# Patient Record
Sex: FEMALE | Race: WHITE | NOT HISPANIC OR LATINO | Employment: UNEMPLOYED | URBAN - METROPOLITAN AREA
[De-identification: names, ages, dates, MRNs, and addresses within clinical notes are randomized per-mention and may not be internally consistent; named-entity substitution may affect disease eponyms.]

---

## 2017-03-31 ENCOUNTER — EPISODE CHANGES (OUTPATIENT)
Dept: HEPATOLOGY | Facility: CLINIC | Age: 42
End: 2017-03-31

## 2018-06-03 ENCOUNTER — HOSPITAL ENCOUNTER (EMERGENCY)
Facility: HOSPITAL | Age: 43
Discharge: HOME | End: 2018-06-04
Attending: EMERGENCY MEDICINE
Payer: MEDICAID

## 2018-06-03 VITALS
HEART RATE: 87 BPM | WEIGHT: 190 LBS | HEIGHT: 60 IN | OXYGEN SATURATION: 98 % | BODY MASS INDEX: 37.3 KG/M2 | RESPIRATION RATE: 22 BRPM | SYSTOLIC BLOOD PRESSURE: 121 MMHG | TEMPERATURE: 98 F | DIASTOLIC BLOOD PRESSURE: 83 MMHG

## 2018-06-03 DIAGNOSIS — S91.319A: ICD-10-CM

## 2018-06-03 DIAGNOSIS — S91.312A LACERATION OF LEFT HEEL, INITIAL ENCOUNTER: Primary | ICD-10-CM

## 2018-06-03 PROCEDURE — 13132 CMPLX RPR F/C/C/M/N/AX/G/H/F: CPT | Mod: LT,,, | Performed by: EMERGENCY MEDICINE

## 2018-06-03 PROCEDURE — 96372 THER/PROPH/DIAG INJ SC/IM: CPT

## 2018-06-03 PROCEDURE — 12044 INTMD RPR N-HF/GENIT7.6-12.5: CPT

## 2018-06-03 PROCEDURE — 13133 CMPLX RPR F/C/C/M/N/AX/G/H/F: CPT | Mod: LT,,, | Performed by: EMERGENCY MEDICINE

## 2018-06-03 PROCEDURE — 99283 EMERGENCY DEPT VISIT LOW MDM: CPT | Mod: 25

## 2018-06-03 PROCEDURE — 63600175 PHARM REV CODE 636 W HCPCS: Performed by: PHYSICIAN ASSISTANT

## 2018-06-03 PROCEDURE — 99283 EMERGENCY DEPT VISIT LOW MDM: CPT | Mod: 25,,, | Performed by: EMERGENCY MEDICINE

## 2018-06-03 PROCEDURE — 25000003 PHARM REV CODE 250: Performed by: PHYSICIAN ASSISTANT

## 2018-06-03 RX ORDER — NAPROXEN 500 MG/1
500 TABLET ORAL
Status: COMPLETED | OUTPATIENT
Start: 2018-06-03 | End: 2018-06-03

## 2018-06-03 RX ORDER — LIDOCAINE HYDROCHLORIDE AND EPINEPHRINE 10; 10 MG/ML; UG/ML
10 INJECTION, SOLUTION INFILTRATION; PERINEURAL ONCE
Status: COMPLETED | OUTPATIENT
Start: 2018-06-04 | End: 2018-06-03

## 2018-06-03 RX ORDER — LORAZEPAM 2 MG/ML
1 INJECTION INTRAMUSCULAR
Status: COMPLETED | OUTPATIENT
Start: 2018-06-03 | End: 2018-06-03

## 2018-06-03 RX ADMIN — LIDOCAINE HYDROCHLORIDE AND EPINEPHRINE 10 ML: 10; 10 INJECTION, SOLUTION INFILTRATION; PERINEURAL at 11:06

## 2018-06-03 RX ADMIN — LORAZEPAM 1 MG: 2 INJECTION INTRAMUSCULAR; INTRAVENOUS at 10:06

## 2018-06-03 RX ADMIN — NAPROXEN 500 MG: 500 TABLET ORAL at 11:06

## 2018-06-04 PROCEDURE — 12044 INTMD RPR N-HF/GENIT7.6-12.5: CPT

## 2018-06-04 PROCEDURE — 25000003 PHARM REV CODE 250: Performed by: PHYSICIAN ASSISTANT

## 2018-06-04 RX ORDER — CEPHALEXIN 250 MG/1
250 CAPSULE ORAL EVERY 12 HOURS
Qty: 14 CAPSULE | Refills: 0 | Status: SHIPPED | OUTPATIENT
Start: 2018-06-04 | End: 2018-06-11

## 2018-06-04 RX ORDER — CEPHALEXIN 500 MG/1
500 CAPSULE ORAL
Status: COMPLETED | OUTPATIENT
Start: 2018-06-04 | End: 2018-06-04

## 2018-06-04 RX ADMIN — CEPHALEXIN 500 MG: 500 CAPSULE ORAL at 12:06

## 2018-06-04 NOTE — ED TRIAGE NOTES
Pt presents to ED c/o cutting foot on glass at home. States that a mirror broke and cut her left foot. Bleeding currently controlled. Large laceration noted to left heel. Denies numbness or tingling to foot.     LOC: Patient name and date of birth verified.  The patient is awake, alert and aware of environment with an appropriate affect, the patient is oriented x 3 and speaking appropriately.  Pt in NAD.    APPEARANCE: Patient resting comfortably and in no acute distress, patient is clean and well groomed, patient's clothing is properly fastened.  SKIN: The skin is warm and dry, color consistent with ethnicity, patient has normal skin turgor and moist mucus membranes, skin intact, no breakdown or brusing noted.  MUSCULOSKELETAL: Patient moving all extremities well, no obvious swelling or deformities noted.  RESPIRATORY: Airway is open and patent, respirations are spontaneous, patient has a normal effort and rate, no accessory muscle use noted.  CARDIAC: Patient has a normal rate and rhythm, no periphreal edema noted, capillary refill < 3 seconds.  ABDOMEN: Soft and non tender to palpation, no distention noted. Bowel sounds present in all four quadrants.  NEUROLOGIC: Eyes open spontaneously, behavior appropriate to situation, follows commands, facial expression symmetrical, bilateral hand grasp equal and even, purposeful motor response noted, normal sensation in all extremities when touched with a finger.

## 2018-06-04 NOTE — DISCHARGE INSTRUCTIONS
Take the antibiotic, Keflex, as prescribed.    Wash the foot with warm water and Dial soap once daily. Apply a very thin layer of antibiotic ointment (over the counter, such as Neosporin). Dap with a clean, dry, piece of gauze. Keep the wound covered. Elevate the foot as much as possible.    If you have pain - apply an ice pack to the area and take Ibuprofen or Tylenol as directed.    If you have fever, redness around the wound, drainage from the wound, or an odor from the wound, return to the ER immediately for further evaluation.

## 2018-06-04 NOTE — ED PROVIDER NOTES
Encounter Date: 6/3/2018    SCRIBE #1 NOTE: I, Daryl Lynne, am scribing for, and in the presence of,  Dr. Cruz. I have scribed the following portions of the note - the APC attestation. Other sections scribed: the X-Ray reading.       History     Chief Complaint   Patient presents with    Laceration     Cut heal on broken mirror, flap of heal skin hanging, bleeding controlled, no other injuries. Hep C +.      This is a 42-year-old female with a past medical history of hep C who presents to the ED with laceration.  Patient reports a mirror breaking in her bedroom, causing injury to her left heel.  She sustained a large laceration to the posterior aspect of her left heel.  Patient was able to obtain hemostasis by applying pressure.  She has full range of motion and denies weakness or numbness.  Her tetanus status is up-to-date.          Review of patient's allergies indicates:  No Known Allergies  Past Medical History:   Diagnosis Date    Anxiety     Back pain     Bipolar depression     Cervical pain (neck)     Hepatitis C     Hyperlipidemia     Insomnia     Sciatic nerve pain     Smoker      Past Surgical History:   Procedure Laterality Date     SECTION      CHOLECYSTECTOMY      [facility]STERECTOMY      [facility]     Family History   Problem Relation Age of Onset    Breast cancer Maternal Grandmother     Ovarian cancer Mother     Colon cancer Neg Hx      Social History   Substance Use Topics    Smoking status: Current Every Day Smoker     Years: 20.00     Types: Cigarettes    Smokeless tobacco: Never Used    Alcohol use Yes      Comment: occasionally     Review of Systems   Constitutional: Negative for chills and fever.   HENT: Negative for sore throat.    Respiratory: Negative for shortness of breath.    Cardiovascular: Negative for chest pain.   Gastrointestinal: Negative for nausea and vomiting.   Genitourinary: Negative for dysuria.   Musculoskeletal: Negative for back pain.   Skin:  Positive for wound.   Neurological: Negative for weakness.   Hematological: Does not bruise/bleed easily.       Physical Exam     Initial Vitals [06/03/18 2231]   BP Pulse Resp Temp SpO2   121/83 87 (!) 22 98.4 °F (36.9 °C) 98 %      MAP       95.67         Physical Exam    Constitutional: She appears well-developed and well-nourished. No distress.   HENT:   Head: Atraumatic.   Eyes: Conjunctivae and EOM are normal. Pupils are equal, round, and reactive to light.   Cardiovascular: Normal rate, regular rhythm and normal heart sounds.   Pulmonary/Chest: Breath sounds normal. No respiratory distress. She has no wheezes. She has no rhonchi. She has no rales.   Abdominal: Soft. Bowel sounds are normal. There is no tenderness. There is no rebound and no guarding.   Musculoskeletal:        Left foot: There is laceration. There is normal range of motion.        Feet:    Neurological: She is alert and oriented to person, place, and time.   Skin: Skin is warm and dry. No rash noted.         ED Course   Lac Repair  Date/Time: 6/4/2018 12:28 AM  Performed by: ANNABELLA TOLENTINO  Authorized by: AMNA PEREIRA   Body area: lower extremity  Location details: left foot  Laceration length: 10 cm  Foreign bodies: no foreign bodies  Tendon involvement: none  Nerve involvement: none  Vascular damage: no  Anesthesia: local infiltration    Anesthesia:  Local Anesthetic: lidocaine 1% with epinephrine  Anesthetic total: 3 mL  Preparation: Patient was prepped and draped in the usual sterile fashion.  Irrigation solution: saline  Irrigation method: syringe  Amount of cleaning: extensive  Debridement: none  Degree of undermining: none  Skin closure: 4-0 nylon  Number of sutures: 20  Technique: simple  Approximation: close  Approximation difficulty: complex  Dressing: non-stick sterile dressing and petrolatum-impregnated gauze  Patient tolerance: Patient tolerated the procedure well with no immediate complications        Labs Reviewed -  No data to display       X-Rays:   Independently Interpreted Readings:   Other Readings:  Left foot x-ray: No evidence of acute bony injury noted.    Medical Decision Making:   History:   Old Medical Records: I decided to obtain old medical records.  Clinical Tests:   Radiological Study: Ordered and Reviewed       APC / Resident Notes:   42-year-old female presents with laceration of left heel.  She has full range of motion and is neurovascularly intact.  The wound is large (10cm) with a flap.The wound was explored to the base without evidence of foreign body. It was repaired at the bedside, as detailed in the procedure note. Dressing applied and crutches provided. Patient instructed on toe touch weight bearing until wound reassessed in 72 hours. Will prescribe Keflex for prophylaxis. Patient instructed on explicit wound care instructions and need for suture removal in 10 days. She is stable for discharge. She and family verbalized understanding. I discussed the care of this patient with my supervising MD.        Scribe Attestation:   Scribe #1: I performed the above scribed service and the documentation accurately describes the services I performed. I attest to the accuracy of the note.    Attending Attestation:     Physician Attestation Statement for NP/PA:   I discussed this assessment and plan of this patient with the NP/PA, but I did not personally examine the patient. The face to face encounter was performed by the NP/PA.    Other NP/PA Attestation Additions:    History of Present Illness: 42 year old female presents for evaluation of a laceration to her left foot earlier tonight.                       Clinical Impression:   The primary encounter diagnosis was Laceration of left heel, initial encounter. A diagnosis of Laceration of heel was also pertinent to this visit.    X-Ray Foot Complete Left   Final Result      Soft tissue laceration involving the heel.  No distinct radiopaque foreign body or acute bony  abnormality.         Electronically signed by: Francis Ma MD   Date:    06/03/2018   Time:    23:36          Disposition:   Disposition: Discharged  Condition: Stable                        Fabby Arias PA-C  06/04/18 0034

## 2019-07-13 ENCOUNTER — HOSPITAL ENCOUNTER (EMERGENCY)
Facility: HOSPITAL | Age: 44
Discharge: HOME | End: 2019-07-13
Attending: EMERGENCY MEDICINE
Payer: MEDICAID

## 2019-07-13 VITALS
BODY MASS INDEX: 41.6 KG/M2 | DIASTOLIC BLOOD PRESSURE: 89 MMHG | HEART RATE: 88 BPM | SYSTOLIC BLOOD PRESSURE: 139 MMHG | RESPIRATION RATE: 18 BRPM | WEIGHT: 211.88 LBS | HEIGHT: 60 IN | TEMPERATURE: 98 F | OXYGEN SATURATION: 98 %

## 2019-07-13 DIAGNOSIS — L03.213 PRESEPTAL CELLULITIS OF RIGHT EYE: Primary | ICD-10-CM

## 2019-07-13 PROCEDURE — 99284 EMERGENCY DEPT VISIT MOD MDM: CPT

## 2019-07-13 PROCEDURE — 25000003 PHARM REV CODE 250: Performed by: PHYSICIAN ASSISTANT

## 2019-07-13 PROCEDURE — 99284 EMERGENCY DEPT VISIT MOD MDM: CPT | Mod: ,,, | Performed by: PHYSICIAN ASSISTANT

## 2019-07-13 PROCEDURE — 99284 PR EMERGENCY DEPT VISIT,LEVEL IV: ICD-10-PCS | Mod: ,,, | Performed by: PHYSICIAN ASSISTANT

## 2019-07-13 RX ORDER — NAPROXEN 500 MG/1
500 TABLET ORAL
Status: COMPLETED | OUTPATIENT
Start: 2019-07-13 | End: 2019-07-13

## 2019-07-13 RX ORDER — AMOXICILLIN AND CLAVULANATE POTASSIUM 875; 125 MG/1; MG/1
1 TABLET, FILM COATED ORAL
Status: COMPLETED | OUTPATIENT
Start: 2019-07-13 | End: 2019-07-13

## 2019-07-13 RX ORDER — SULFAMETHOXAZOLE AND TRIMETHOPRIM 800; 160 MG/1; MG/1
1 TABLET ORAL 2 TIMES DAILY
Qty: 14 TABLET | Refills: 0 | Status: SHIPPED | OUTPATIENT
Start: 2019-07-13 | End: 2019-07-20

## 2019-07-13 RX ORDER — NAPROXEN 500 MG/1
500 TABLET ORAL 2 TIMES DAILY WITH MEALS
Qty: 20 TABLET | Refills: 0 | Status: SHIPPED | OUTPATIENT
Start: 2019-07-13

## 2019-07-13 RX ORDER — AMOXICILLIN AND CLAVULANATE POTASSIUM 875; 125 MG/1; MG/1
1 TABLET, FILM COATED ORAL 2 TIMES DAILY
Qty: 14 TABLET | Refills: 0 | Status: SHIPPED | OUTPATIENT
Start: 2019-07-13

## 2019-07-13 RX ORDER — SULFAMETHOXAZOLE AND TRIMETHOPRIM 800; 160 MG/1; MG/1
1 TABLET ORAL
Status: COMPLETED | OUTPATIENT
Start: 2019-07-13 | End: 2019-07-13

## 2019-07-13 RX ADMIN — SULFAMETHOXAZOLE AND TRIMETHOPRIM 1 TABLET: 800; 160 TABLET ORAL at 04:07

## 2019-07-13 RX ADMIN — AMOXICILLIN AND CLAVULANATE POTASSIUM 1 TABLET: 875; 125 TABLET, FILM COATED ORAL at 04:07

## 2019-07-13 RX ADMIN — NAPROXEN 500 MG: 500 TABLET ORAL at 04:07

## 2019-07-13 NOTE — DISCHARGE INSTRUCTIONS
Call to schedule a an outpatient follow-up with ophthalmology in 1-2 days.    Return to the emergency department if you develop fever, increased redness or swelling, significant pain with movement of the eye, vision changes or any new or concerning symptoms.    Our goal in the emergency department is to always give you outstanding care and exceptional service. You may receive a survey by mail or e-mail in the next week regarding your experience in our ED. We would greatly appreciate your completing and returning the survey. Your feedback provides us with a way to recognize our staff who give very good care and it helps us learn how to improve when your experience was below our aspiration of excellence.

## 2019-07-13 NOTE — ED TRIAGE NOTES
Patient reports to the ED today with reports of R sided facial swelling. Patient states that last night under her R eye she started to experience soreness. Pt states that this morning she woke up and under her eye was swollen.

## 2019-07-13 NOTE — ED PROVIDER NOTES
Encounter Date: 2019       History     Chief Complaint   Patient presents with    Facial Swelling     This is a 43-year-old female with a past medical history of hep C who presents the ED with a chief complaint of facial swelling.  Patient states that she began having tenderness of the left lower eyelid yesterday evening.  She states this morning she awoke with swelling and redness of the lower lid, which has progressively worsened over the course of the day.  She does not wear glasses or contact lenses.  Patient denies any trauma, URI symptoms, fever, chills, eye drainage, vision changes, pain with eye movements, known sick contacts or additional complaints.  She is not currently taking any medications.        Review of patient's allergies indicates:  No Known Allergies  Past Medical History:   Diagnosis Date    Anxiety     Back pain     Bipolar depression     Cervical pain (neck)     Hepatitis C     Hyperlipidemia     Insomnia     Sciatic nerve pain     Smoker      Past Surgical History:   Procedure Laterality Date     SECTION      CHOLECYSTECTOMY      [facility]STERECTOMY      [facility]     Family History   Problem Relation Age of Onset    Breast cancer Maternal Grandmother     Ovarian cancer Mother     Colon cancer Neg Hx      Social History     Tobacco Use    Smoking status: Current Every Day Smoker     Packs/day: 0.50     Years: 20.00     Pack years: 10.00     Types: Cigarettes, Vaping with nicotine    Smokeless tobacco: Never Used   Substance Use Topics    Alcohol use: Yes     Comment: occasionally    Drug use: Yes     Types: Marijuana     Comment: occasionally     Review of Systems   Constitutional: Negative for chills and fever.   HENT: Negative for sinus pressure, sinus pain, sore throat and voice change.    Eyes: Negative for discharge and visual disturbance.        Right periorbital swelling   Respiratory: Negative for shortness of breath.    Cardiovascular: Negative for chest  pain.   Gastrointestinal: Negative for nausea.   Genitourinary: Negative for dysuria.   Musculoskeletal: Negative for back pain.   Skin: Negative for rash.   Neurological: Negative for weakness.   Hematological: Does not bruise/bleed easily.       Physical Exam     Initial Vitals [07/13/19 1615]   BP Pulse Resp Temp SpO2   139/89 88 18 98 °F (36.7 °C) 98 %      MAP       --         Physical Exam    Constitutional: She appears well-developed and well-nourished. No distress.   HENT:   Head: Atraumatic.   Eyes: Conjunctivae and EOM are normal. Pupils are equal, round, and reactive to light. Right eye exhibits no discharge and no exudate. Left eye exhibits no discharge and no exudate. Right conjunctiva is not injected. Right eye exhibits normal extraocular motion. Left eye exhibits normal extraocular motion.   +chalaizon of the right lower lid  Swelling, erythema, and tenderness to palpation of the right lower eyelid and face  No proptosis  Nonpainful extraocular muscle movements   Cardiovascular: Normal rate, regular rhythm and normal heart sounds.   Pulmonary/Chest: Breath sounds normal. No respiratory distress. She has no wheezes. She has no rhonchi. She has no rales.   Abdominal: Soft. Bowel sounds are normal. There is no tenderness.   Neurological: She is alert and oriented to person, place, and time.   Skin: Skin is warm and dry. No rash noted.             ED Course   Procedures  Labs Reviewed - No data to display       Imaging Results    None                APC / Resident Notes:   43-year-old female presenting with swelling and pain of the right lower eyelid x1 day.  On exam she is afebrile and nontoxic.  She has normal extraocular muscle movements.  There is localized swelling, erythema, and tenderness of the lower lid, as noted. This is associated with a chalazion of the lower lid.  There is no induration or fluctuance to suggest abscess.  Have considered but do not suspect orbital cellulitis or conjunctivitis.   I will discharge the patient with Bactrim and Augmentin.  I have discussed detailed return to ED precautions and have recommended that the patient follow up with her ophthalmologist in 1-2 days.  She verbalized understanding and agrees with the plan and course of treatment.  She is stable for discharge. I discussed the care of this patient with my supervising MD.                  Clinical Impression:       ICD-10-CM ICD-9-CM   1. Preseptal cellulitis of right eye L03.213 373.13         Disposition:   Disposition: Discharged  Condition: Stable                        Fabby Arias PA-C  07/13/19 6611

## 2019-07-13 NOTE — ED NOTES
Patient identifiers verified and correct for Nupur Marie  LOC: The patient is awake, alert and aware of environment with an appropriate affect, the patient is oriented x 3 and speaking appropriately.   APPEARANCE: Patient appears comfortable and in no acute distress, patient is clean and well groomed. Swelling noted to R eye  SKIN: The skin is warm and dry, color consistent with ethnicity, patient has normal skin turgor and moist mucus membranes, skin intact, no breakdown or bruising noted.   MUSCULOSKELETAL: Patient moving all extremities spontaneously, no swelling noted.  RESPIRATORY: Airway is open and patent, respirations are spontaneous, patient has a normal effort and rate, no accessory muscle use noted, O2 sats noted at 98% on room air.  CARDIAC: Denies chest pain  capillary refill < 3 seconds.   GASTRO: Denies abdominal pain n/v. Pt states bowel movements have been regular.  : Pt denies any pain or frequency with urination.  NEURO: Pt opens eyes spontaneously, behavior appropriate to situation, follows commands, facial expression symmetrical, bilateral hand grasp equal and even, purposeful motor response noted, normal sensation in all extremities when touched with a finger.